# Patient Record
Sex: MALE | Race: WHITE | NOT HISPANIC OR LATINO | Employment: FULL TIME | URBAN - METROPOLITAN AREA
[De-identification: names, ages, dates, MRNs, and addresses within clinical notes are randomized per-mention and may not be internally consistent; named-entity substitution may affect disease eponyms.]

---

## 2020-01-11 ENCOUNTER — TRANSCRIBE ORDERS (OUTPATIENT)
Dept: ADMINISTRATIVE | Facility: HOSPITAL | Age: 60
End: 2020-01-11

## 2020-01-11 ENCOUNTER — HOSPITAL ENCOUNTER (OUTPATIENT)
Dept: RADIOLOGY | Facility: HOSPITAL | Age: 60
Discharge: HOME | End: 2020-01-11
Payer: COMMERCIAL

## 2020-01-11 DIAGNOSIS — M17.0 PRIMARY OSTEOARTHRITIS OF BOTH KNEES: ICD-10-CM

## 2020-01-11 DIAGNOSIS — M17.0 PRIMARY OSTEOARTHRITIS OF BOTH KNEES: Primary | ICD-10-CM

## 2020-01-11 PROCEDURE — 73562 X-RAY EXAM OF KNEE 3: CPT

## 2020-07-24 VITALS
TEMPERATURE: 97.9 F | BODY MASS INDEX: 38.49 KG/M2 | WEIGHT: 290.4 LBS | HEART RATE: 83 BPM | HEIGHT: 73 IN | SYSTOLIC BLOOD PRESSURE: 130 MMHG | DIASTOLIC BLOOD PRESSURE: 77 MMHG

## 2020-07-24 DIAGNOSIS — M17.0 BILATERAL PRIMARY OSTEOARTHRITIS OF KNEE: Primary | ICD-10-CM

## 2020-07-24 PROCEDURE — 99203 OFFICE O/P NEW LOW 30 MIN: CPT | Performed by: ORTHOPAEDIC SURGERY

## 2020-07-24 NOTE — PROGRESS NOTES
Assessment/Plan:  1  Bilateral primary osteoarthritis of knee         Scribe Attestation    I,:   Claude Martínez am acting as a scribe while in the presence of the attending physician :        I,:   Amber Rincon MD personally performed the services described in this documentation    as scribed in my presence :          X-rays of both his knees demonstrates significant amount osteoarthritis with diffused osteophytes  Upon physical examination, he does demonstrate with decreased knee flexion and a 1+ effusion to his left knee as compared to his right knee  He did inquire about a possible bilateral arthroscopic chondroplasty, however I do not recommend this as he may remain symptomatic postoperatively  I did discuss that he will require a future knee replacement, however he states he has 2 years until he retires and would like to hold off on significant surgical intervention until then  I did discussed non operative treatment in the form of formal physical therapy, steroid injection, viscosupplementation or ice/heat and Tylenol/Advil  He has declined these options today in the office and states he will see his primary care physician for repeat viscosupplementation  At this time, we will see him back on an as-needed basis  Subjective:   Luther Baker is a 64 y o  male who presents to the office today for initial evaluation of bilateral knee pain, with left greater than right  He denies any mechanism of injury, however he states he has put a significant amount of miles on his knees and stands roughly 8 hours a day while at work  He has been experiencing left sided knee pain for about 5 years where his right sided knee pain has increased within the last year  He reports gross instability about both his knees  At today's visit, he localizes the majority of his discomfort about both his knees along the posterior and anterior aspect  He describes his pain as constant, achy, sore and moderate in intensity  He notes conservative treatment in the form of a steroid injection and viscosupplementation  He denies any radicular symptoms  He denies any numbness and tingling  Pertinent history includes a previous compound fracture to the right tibia  Review of Systems   Constitutional: Positive for activity change  Negative for chills, fever and unexpected weight change  HENT: Negative for hearing loss, nosebleeds and sore throat  Eyes: Negative for pain, redness and visual disturbance  Respiratory: Negative for cough, shortness of breath and wheezing  Cardiovascular: Negative for chest pain, palpitations and leg swelling  Gastrointestinal: Negative for abdominal pain, nausea and vomiting  Endocrine: Negative for polyphagia and polyuria  Genitourinary: Negative for dysuria and hematuria  Musculoskeletal:        See HPI   Skin: Negative for rash and wound  Neurological: Negative for dizziness, numbness and headaches  Psychiatric/Behavioral: Negative for decreased concentration and suicidal ideas  The patient is not nervous/anxious  History reviewed  No pertinent past medical history  Past Surgical History:   Procedure Laterality Date    GASTRIC BYPASS         Family History   Problem Relation Age of Onset    No Known Problems Mother     No Known Problems Father        Social History     Occupational History    Not on file   Tobacco Use    Smoking status: Never Smoker    Smokeless tobacco: Never Used   Substance and Sexual Activity    Alcohol use: Yes     Comment: occasionally    Drug use: Never    Sexual activity: Not on file       No current outpatient medications on file  No Known Allergies    Objective:  Vitals:    07/24/20 0909   BP: 130/77   Pulse: 83   Temp: 97 9 °F (36 6 °C)       Right Knee Exam     Tenderness   The patient is experiencing no tenderness       Range of Motion   Extension: 0   Flexion: 120     Tests   Varus: negative Valgus: negative  Lachman:  Anterior - negative    Posterior - negative  Drawer:  Anterior - negative    Posterior - negative    Other   Erythema: absent  Scars: absent  Sensation: normal  Pulse: present  Swelling: none  Effusion: no effusion present      Left Knee Exam     Tenderness   The patient is experiencing no tenderness  Range of Motion   Left knee extension: -3    Flexion: 110     Tests   Varus: negative Valgus: positive (secondary to OA)  Lachman:  Anterior - negative    Posterior - negative  Drawer:  Anterior - negative     Posterior - negative    Other   Erythema: absent  Scars: absent  Sensation: normal  Pulse: present  Swelling: none  Effusion: effusion (1+) present          Observations   Left Knee   Positive for effusion (1+)  Right Knee   Negative for effusion  Physical Exam   Constitutional: He is oriented to person, place, and time  He appears well-developed and well-nourished  HENT:   Head: Normocephalic and atraumatic  Eyes: Pupils are equal, round, and reactive to light  Conjunctivae are normal    Neck: Normal range of motion  Neck supple  Cardiovascular: Normal rate and intact distal pulses  Pulmonary/Chest: Effort normal  No respiratory distress  Musculoskeletal:        Right knee: He exhibits no effusion  Left knee: He exhibits effusion (1+)  As noted in HPI   Neurological: He is alert and oriented to person, place, and time  Skin: Skin is warm and dry  Psychiatric: He has a normal mood and affect  His behavior is normal    Nursing note and vitals reviewed  I have personally reviewed pertinent films in PACS and my interpretation is as follows:  X-rays of the right knee obtained on 01/11/2020 demonstrate moderate patellofemoral compartment osteoarthritis with diffuse osteophytes  X-rays of the left knee obtained on 01/11/2020 demonstrate mild patellofemoral and moderate medial compartment osteoarthritis with diffuse osteophytes

## 2021-07-07 ENCOUNTER — APPOINTMENT (EMERGENCY)
Dept: RADIOLOGY | Facility: HOSPITAL | Age: 61
End: 2021-07-07
Payer: OTHER MISCELLANEOUS

## 2021-07-07 ENCOUNTER — HOSPITAL ENCOUNTER (EMERGENCY)
Facility: HOSPITAL | Age: 61
Discharge: HOME | End: 2021-07-07
Attending: EMERGENCY MEDICINE
Payer: OTHER MISCELLANEOUS

## 2021-07-07 VITALS
HEART RATE: 71 BPM | WEIGHT: 287 LBS | TEMPERATURE: 98.8 F | BODY MASS INDEX: 37.87 KG/M2 | DIASTOLIC BLOOD PRESSURE: 93 MMHG | SYSTOLIC BLOOD PRESSURE: 139 MMHG | OXYGEN SATURATION: 97 % | RESPIRATION RATE: 18 BRPM

## 2021-07-07 DIAGNOSIS — M25.512 LEFT SHOULDER PAIN: Primary | ICD-10-CM

## 2021-07-07 PROCEDURE — 99284 EMERGENCY DEPT VISIT MOD MDM: CPT | Performed by: PHYSICIAN ASSISTANT

## 2021-07-07 PROCEDURE — 73030 X-RAY EXAM OF SHOULDER: CPT

## 2021-07-07 PROCEDURE — 99283 EMERGENCY DEPT VISIT LOW MDM: CPT

## 2021-07-07 RX ORDER — NAPROXEN 500 MG/1
500 TABLET ORAL 2 TIMES DAILY WITH MEALS
Qty: 14 TABLET | Refills: 0 | Status: SHIPPED | OUTPATIENT
Start: 2021-07-07 | End: 2021-07-16

## 2021-07-07 NOTE — Clinical Note
Sara Oswald was seen and treated in our emergency department on 7/7/2021  Diagnosis:     Torrey Kim  may return to work on return date  He may return on this date: 07/12/2021         If you have any questions or concerns, please don't hesitate to call        Jose Gonzalez PA-C    ______________________________           _______________          _______________  Hospital Representative                              Date                                Time

## 2021-07-07 NOTE — ED PROVIDER NOTES
History  Chief Complaint   Patient presents with    Shoulder Injury     a cabinet fell on its left shoulder  pain and unable to raise left arm up     61-year-old male presenting today with left-sided shoulder pain after a cabinet was falling and he went to go catch this cabinet, which he did successfully, however then yanked on his left shoulder, patient felt as though his shoulder move forward and possibly dislocated however then reduced itself  This happens earlier today and has had pain and decreased range of motion of the left shoulder  Did not hit his head, not experiencing any neck pain  Pain does not radiate  Denies numbness, paresthesias, bruising or open injury  None       History reviewed  No pertinent past medical history  Past Surgical History:   Procedure Laterality Date    GASTRIC BYPASS         Family History   Problem Relation Age of Onset    No Known Problems Mother     No Known Problems Father      I have reviewed and agree with the history as documented  E-Cigarette/Vaping    E-Cigarette Use Never User      E-Cigarette/Vaping Substances    Nicotine No     THC No     CBD No     Flavoring No     Other No     Unknown No      Social History     Tobacco Use    Smoking status: Never Smoker    Smokeless tobacco: Never Used   Vaping Use    Vaping Use: Never used   Substance Use Topics    Alcohol use: Yes     Comment: occasionally    Drug use: Never       Review of Systems   Constitutional: Negative  Negative for chills, fatigue and fever  HENT: Negative  Negative for congestion, postnasal drip, rhinorrhea and sore throat  Eyes: Negative  Respiratory: Negative  Negative for cough, shortness of breath and wheezing  Cardiovascular: Negative  Gastrointestinal: Negative  Negative for abdominal pain, diarrhea, nausea and vomiting  Endocrine: Negative  Genitourinary: Negative  Musculoskeletal: Positive for arthralgias   Negative for back pain, gait problem, joint swelling, myalgias, neck pain and neck stiffness  Skin: Negative  Neurological: Negative  Hematological: Negative  Psychiatric/Behavioral: Negative  All other systems reviewed and are negative  Physical Exam  Physical Exam  Vitals and nursing note reviewed  Constitutional:       Appearance: Normal appearance  HENT:      Head: Normocephalic and atraumatic  Right Ear: External ear normal       Left Ear: External ear normal       Nose: Nose normal    Eyes:      Conjunctiva/sclera: Conjunctivae normal    Cardiovascular:      Rate and Rhythm: Normal rate  Pulses: Normal pulses  Pulmonary:      Effort: Pulmonary effort is normal       Comments: S PO2 is 97% indicating adequate oxygenation  Abdominal:      General: There is no distension  Musculoskeletal:         General: No deformity  Normal range of motion  Arms:       Cervical back: Normal range of motion  Skin:     General: Skin is warm and dry  Capillary Refill: Capillary refill takes less than 2 seconds  Findings: No rash  Neurological:      General: No focal deficit present  Mental Status: He is alert and oriented to person, place, and time  Mental status is at baseline  Psychiatric:         Mood and Affect: Mood normal          Behavior: Behavior normal          Thought Content:  Thought content normal          Judgment: Judgment normal          Vital Signs  ED Triage Vitals [07/07/21 1206]   Temperature Pulse Respirations Blood Pressure SpO2   98 8 °F (37 1 °C) 71 18 139/93 97 %      Temp src Heart Rate Source Patient Position - Orthostatic VS BP Location FiO2 (%)   -- -- -- -- --      Pain Score       6           Vitals:    07/07/21 1206   BP: 139/93   Pulse: 71         Visual Acuity      ED Medications  Medications - No data to display    Diagnostic Studies  Results Reviewed     None                 XR shoulder 2+ views LEFT   ED Interpretation by Kartik Muro PA-C (07/07 1444)   Questionable Bankart lesion                 Procedures  Procedures         ED Course                             SBIRT 20yo+      Most Recent Value   SBIRT (24 yo +)   In order to provide better care to our patients, we are screening all of our patients for alcohol and drug use  Would it be okay to ask you these screening questions? No Filed at: 07/07/2021 1316                    [facility]  Number of Diagnoses or Management Options  Left shoulder pain  Diagnosis management comments: Discussed imaging results with the patient  Will have patient follow up with Orthopedics for re-evaluation for potential rotator cuff injury  Questionable Bankart injury  Patient was placed in a left shoulder mobilizer assessed by me with good neurovascular exam before and after, given education regarding ways to avoid stiff shoulder  Patient is informed to return to the emergency department for worsening of symptoms and was given proper education regarding their diagnosis and symptoms  Otherwise the patient is informed to follow up with orthopedics for re-evaluation  The patient verbalizes understanding and agrees with above assessment and plan  All questions were answered  Please Note: Fluency Direct voice recognition software may have been used in the creation of this document  Wrong words or sound a like substitutions may have occurred due to the inherent limitations of the voice software             Amount and/or Complexity of Data Reviewed  Tests in the radiology section of CPT®: ordered and reviewed  Review and summarize past medical records: yes  Independent visualization of images, tracings, or specimens: yes        Disposition  Final diagnoses:   Left shoulder pain     Time reflects when diagnosis was documented in both MDM as applicable and the Disposition within this note     Time User Action Codes Description Comment    7/7/2021  2:49 PM Jose Cao Add [C09 588] Left shoulder pain       ED Disposition ED Disposition Condition Date/Time Comment    Discharge Stable Wed Jul 7, 2021  2:43 PM Ricardo Daniels discharge to home/self care  Follow-up Information     Follow up With Specialties Details Why Contact Info Additional [address] Emergency Department Emergency Medicine Go to  If symptoms worsen [address] [address]  [address] [address] Emergency Department, [facility], [address] [place] [address], [phone]    Jes Bryant MD Orthopedic Surgery Schedule an appointment as soon as possible for a visit in 1 day  [address] [address]  [phone]             Discharge Medication List as of 7/7/2021  2:50 PM      START taking these medications    Details   naproxen (NAPROSYN) 500 mg tablet Take 1 tablet (500 mg total) by mouth 2 (two) times a day with meals for 7 days, Starting Wed 7/7/2021, Until Wed 7/14/2021, Normal           No discharge procedures on file      PDMP Review     None          ED Provider  Electronically Signed by           Cristhian Howe PA-C  07/07/21 7735

## 2021-07-07 NOTE — Clinical Note
Catracho Garza was seen and treated in our emergency department on 7/7/2021  Diagnosis:     Carmita Martinez  may return to work on return date  He may return on this date: 07/12/2021         If you have any questions or concerns, please don't hesitate to call        Isabell Robles PA-C    ______________________________           _______________          _______________  Hospital Representative                              Date                                Time

## 2021-07-16 VITALS
SYSTOLIC BLOOD PRESSURE: 116 MMHG | DIASTOLIC BLOOD PRESSURE: 75 MMHG | HEART RATE: 72 BPM | BODY MASS INDEX: 38.41 KG/M2 | WEIGHT: 289.8 LBS | HEIGHT: 73 IN

## 2021-07-16 DIAGNOSIS — S46.212A BICEPS TENDON RUPTURE, PROXIMAL, LEFT, INITIAL ENCOUNTER: Primary | ICD-10-CM

## 2021-07-16 PROCEDURE — 99214 OFFICE O/P EST MOD 30 MIN: CPT | Performed by: ORTHOPAEDIC SURGERY

## 2021-07-16 NOTE — PATIENT INSTRUCTIONS
Exercises for Shoulder Abduction and Adduction   WHAT YOU NEED TO KNOW:   Shoulder abduction and adduction exercises work the muscles at the back of your shoulder and your upper back  DISCHARGE INSTRUCTIONS:   Contact your healthcare provider if:   · You have sharp or worsening pain during exercise or at rest     · You have questions or concerns about your shoulder exercises  Before you exercise:  Warm up and stretch before you exercise  Walk or ride a stationary bike for 5 to 10 minutes to help you warm up  Stretching helps increase range of motion  It may also decrease muscle soreness and help prevent another injury  Your healthcare provider will tell you which of the following stretches to do:  · Crossover arm stretch:  Relax your shoulders  Hold your upper arm with the opposite hand  Pull your arm across your chest until you feel a stretch  Hold the stretch for 30 seconds  Return to the starting position  · Shoulder flexion stretch:  Stand facing a wall  Slowly walk your fingers up the wall until you feel a stretch  Hold the stretch for 30 seconds  Return to the starting position  · Sleeper stretch:  Lie on your injured side on a firm, flat surface  Bend the elbow of your injured arm 90° with your hand facing up  Use your arm that is not injured to slowly push your injured arm down  Stop when you feel a stretch at the back of your injured shoulder  Hold the stretch for 30 seconds  Slowly return to the starting position  How to exercise with a weight:  Your healthcare provider will tell you how much weight to use  · Shoulder abduction:  Stand and hold a weight in your hand with your palm facing your body  Slowly raise your arm to the side with your thumb pointing up  Then raise your arm over your head as far as you can without pain  Hold this position for as long as directed  Do not raise your arm over your head unless your healthcare provider says it is okay            · Shoulder adduction:  Lie on your back on a firm surface  Extend your arm out to a "T " Bend your elbow so your forearm in the air  Hold a weight in your hand  Slowly raise your arm toward the ceiling and straighten your elbow  Hold this position for as long as directed  Slowly return to the starting position  How to exercise with an exercise band:   · Shoulder abduction:  Wrap the exercise band around a heavy, stable object near your foot  Grab the band with the hand of your injured shoulder  Keep your arm straight  Slowly raise your arm to the side with your thumb pointing up  Then, slowly pull the band over your head as far as you can without pain  Do not raise your arm over your head unless your healthcare provider says it is okay  Do not let your shoulder shrug  Hold this position for as long as directed  Slowly return to the starting position  · Shoulder adduction:  Wrap the exercise band around a heavy, stable object  Stand and face away from where the band is anchored  Hold each end of the band in both hands with your elbows bent  Your elbows should not be behind your body  Keep your arms parallel to the floor and slowly straighten your elbows  Hold this position for as long as directed  Slowly return to the starting position  Follow up with your physical therapist as directed:  Write down your questions so you remember to ask them at your visits  © Copyright [address] Information is for End User's use only and may not be sold, redistributed or otherwise used for commercial purposes  All illustrations and images included in CareNotes® are the copyrighted property of A D A M , Inc  or [facility] Joesph Valenzuela   The above information is an  only  It is not intended as medical advice for individual conditions or treatments  Talk to your doctor, nurse or pharmacist before following any medical regimen to see if it is safe and effective for you    Exercises for Internal and External Shoulder Rotation   WHAT YOU NEED TO KNOW:   Exercises for internal shoulder rotation work the muscles in your chest and front of your shoulder  Exercises for external shoulder rotation work the muscles in the back of your shoulder and upper back  DISCHARGE INSTRUCTIONS:   Contact your healthcare provider if:   · You have sharp or worsening pain during exercise or at rest     · You have questions or concerns about your shoulder exercises  Before you exercise:  Warm up and stretch before you exercise  Walk or ride a stationary bike for 5 to 10 minutes to help you warm up  Stretching helps increase range of motion  It may also decrease muscle soreness and help prevent another injury  Your healthcare provider will tell you which of the following stretches to do:  · Crossover arm stretch:  Relax your shoulders  Hold your upper arm with the opposite hand  Pull your arm across your chest until you feel a stretch  Hold the stretch for 30 seconds  Return to the starting position  · Shoulder flexion stretch:  Stand facing a wall  Slowly walk your fingers up the wall until you feel a stretch  Hold the stretch for 30 seconds  Return to the starting position  · Sleeper stretch:  Lie on your injured side on a firm, flat surface  Bend the elbow of your injured arm 90° with your hand facing up  Use your arm that is not injured to slowly push your injured arm down  Stop when you feel a stretch at the back of your injured shoulder  Hold the stretch for 30 seconds  Slowly return to the starting position  How to exercise with a weight:  Your healthcare provider will tell you how much weight to exercise with  · Shoulder internal rotation:  Sit in a chair  Place a rolled up towel between your elbow and your side  Bend your elbow to 90°  Gently squeeze the towel with your elbow to prevent it from falling out  Hold the weight with your thumb pointing up   Slowly move the weight across your chest  Stop when your hand reaches your opposite arm  Hold this position for as many seconds as directed  Slowly return to the starting position  · Shoulder external rotation:  Lie on your side with your injured shoulder facing up  Bend your elbow 90°  Place a rolled up towel between your elbow and your side  Hold a weight in your hand  Gently squeeze the towel with your elbow to prevent it from falling out  Slowly rotate your arm outward, but keep your elbow bent  Stop when you feel a stretch  Hold this position for 30 seconds or as directed  Slowly return to the starting position  How to exercise with an exercise band:   · Shoulder internal rotation:  Tie one end of the exercise band to a heavy, secure object  Sit in a chair  Place a rolled up towel between your elbow and your side  Bend your elbow to 90°  Gently squeeze the towel with your elbow to prevent it from falling out  Slowly pull the band across your chest  Stop when your hand reaches your opposite arm  Hold this position for as many seconds as directed  Slowly return to the starting position  · Shoulder external rotation:  Hold one end of the exercise band on the side that is not injured  Place a rolled up towel between your elbow and your side  Bend your elbow 90°  Squeeze the towel with your elbow  Grab the end of the band and slowly turn your arm outward, but keep your elbow bent  Stop when you feel a stretch  Hold this position for 30 seconds or as directed  Slowly return to the starting position  Follow up with your physical therapist as directed:  Write down your questions so you remember to ask them at your visits  © Copyright [address] Information is for End User's use only and may not be sold, redistributed or otherwise used for commercial purposes  All illustrations and images included in CareNotes® are the copyrighted property of [employer]  or [facility] Joesph Valenzuela   The above information is an  only   It is not intended as medical advice for individual conditions or treatments  Talk to your doctor, nurse or pharmacist before following any medical regimen to see if it is safe and effective for you

## 2021-07-16 NOTE — PROGRESS NOTES
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  He does demonstrate pain and weakness with speed's test on exam today  His distal biceps tendon is intact upon hook test   I do not suspected rotator cuff tear as he demonstrates excellent strength in all planes today  I do believe that he will continue to improve as time goes on  I did provide him with a physician directed exercise program   He was instructed on proper execution of these exercises today by my   He may continue with activities of daily living as tolerated  Should he fail to have symptomatic relief after 6 weeks and the physician directed exercise program he may follow up with me  At that time we may consider further diagnostic studies such as an MRI  Otherwise, he may follow up with me on an as-needed basis  Subjective:   Tawanna Martinez is a 63 y o  male who presents to the office today for initial evaluation of the left shoulder   He states that he suffered an injury when he caught a heavy filing cabinet with his left arm on 7/7/2021  He states that he felt a painful pop and snap into the shoulder  He states that his pain is localized anteriorly as well as superiorly  He states that he did reach up this morning and felt similar painful pop into the shoulder  He states his range of motion does remain intact however abduction does to exacerbate his pain  He describes it as an intermittent and mild to moderate sharp pain  He does remark on some bruising to the shoulder  Today he denies any distal paresthesias  He did have an x-ray on 7/7/2021 that demonstrated no evidence of dislocation or fracture  Review of Systems   Constitutional: Negative for chills, fever and unexpected weight change  HENT: Negative for hearing loss, nosebleeds and sore throat  Eyes: Negative for pain, redness and visual disturbance  Respiratory: Negative for cough, shortness of breath and wheezing  Cardiovascular: Negative for chest pain, palpitations and leg swelling  Gastrointestinal: Negative for abdominal pain, nausea and vomiting  Endocrine: Negative for polyphagia and polyuria  Genitourinary: Negative for dysuria and hematuria  Musculoskeletal: Positive for arthralgias and myalgias  See HPI   Skin: Negative for rash and wound  Neurological: Negative for dizziness, numbness and headaches  Psychiatric/Behavioral: Negative for decreased concentration and suicidal ideas  The patient is not nervous/anxious  History reviewed  No pertinent past medical history      Past Surgical History:   Procedure Laterality Date    GASTRIC BYPASS         Family History   Problem Relation Age of Onset    No Known Problems Mother     No Known Problems Father        Social History     Occupational History    Not on file   Tobacco Use    Smoking status: Never Smoker    Smokeless tobacco: Never Used   Vaping Use    Vaping Use: Never used   Substance and Sexual Activity    Alcohol use: Yes     Comment: occasionally    Drug use: Never    Sexual activity: Not on file         Current Outpatient Medications:     naproxen (NAPROSYN) 500 mg tablet, Take 1 tablet (500 mg total) by mouth 2 (two) times a day with meals for 7 days, Disp: 14 tablet, Rfl: 0    No Known Allergies    Objective:  Vitals:    07/16/21 1420   BP: 116/75   Pulse: 72       Left Shoulder Exam     Range of Motion   Active abduction: 160   External rotation: 70 Internal rotation 90 degrees: 40     Muscle Strength   Abduction: 5/5   Internal rotation: 5/5   External rotation: 5/5   Biceps: 4/5     Other   Erythema: absent  Scars: absent  Sensation: normal  Pulse: present     Comments:  Distal biceps intact upon hook test    Speeds test: postitive    Pop-eye deformity            Physical Exam  Vitals reviewed  HENT:      Head: Normocephalic and atraumatic  Eyes:      General:         Right eye: No discharge  Left eye: No discharge  Conjunctiva/sclera: Conjunctivae normal       Pupils: Pupils are equal, round, and reactive to light  Cardiovascular:      Rate and Rhythm: Normal rate  Pulmonary:      Effort: Pulmonary effort is normal  No respiratory distress  Musculoskeletal:      Cervical back: Normal range of motion and neck supple  Comments: As noted in HPI   Skin:     General: Skin is warm and dry  Neurological:      Mental Status: He is alert and oriented to person, place, and time  Psychiatric:         Mood and Affect: Mood normal          Behavior: Behavior normal          I have personally reviewed pertinent films in PACS and my interpretation is as follows:    X-rays of the left shoulder from 7/7/21 demonstrate no acute fracture or other osseous abnormalities   Moderate osteoarthritis of the acromioclavicular joint